# Patient Record
Sex: FEMALE | Race: WHITE | NOT HISPANIC OR LATINO | Employment: FULL TIME | URBAN - METROPOLITAN AREA
[De-identification: names, ages, dates, MRNs, and addresses within clinical notes are randomized per-mention and may not be internally consistent; named-entity substitution may affect disease eponyms.]

---

## 2019-04-21 ENCOUNTER — OUTSIDE PLACE OF SERVICE (OUTPATIENT)
Dept: ADMINISTRATIVE | Facility: OTHER | Age: 59
End: 2019-04-21
Payer: COMMERCIAL

## 2019-04-21 PROCEDURE — 44960 PR APPENDECTOMY,RUPT APPENDX+ABSCESS: ICD-10-PCS | Mod: ,,, | Performed by: SURGERY

## 2019-04-21 PROCEDURE — 44960 APPENDECTOMY: CPT | Mod: ,,, | Performed by: SURGERY

## 2019-04-25 PROCEDURE — 13160 PR SECD CLOS SURG WND EXTEN/COMPLIC: ICD-10-PCS | Mod: 78,,, | Performed by: SURGERY

## 2019-04-25 PROCEDURE — 13160 SEC CLSR SURG WND/DEHSN XTN: CPT | Mod: 78,,, | Performed by: SURGERY

## 2019-05-02 ENCOUNTER — OFFICE VISIT (OUTPATIENT)
Dept: SURGERY | Facility: CLINIC | Age: 59
End: 2019-05-02
Payer: COMMERCIAL

## 2019-05-02 VITALS — HEIGHT: 65 IN | BODY MASS INDEX: 23.99 KG/M2 | WEIGHT: 144 LBS

## 2019-05-02 DIAGNOSIS — K35.32 PERFORATED APPENDICITIS: Primary | ICD-10-CM

## 2019-05-02 PROCEDURE — 99024 PR POST-OP FOLLOW-UP VISIT: ICD-10-PCS | Mod: S$GLB,,, | Performed by: SURGERY

## 2019-05-02 PROCEDURE — 99024 POSTOP FOLLOW-UP VISIT: CPT | Mod: S$GLB,,, | Performed by: SURGERY

## 2019-05-02 RX ORDER — LEVOTHYROXINE SODIUM 137 UG/1
TABLET ORAL
COMMUNITY
Start: 2019-03-11

## 2019-05-02 RX ORDER — FLUTICASONE FUROATE AND VILANTEROL TRIFENATATE 100; 25 UG/1; UG/1
POWDER RESPIRATORY (INHALATION)
Refills: 6 | COMMUNITY
Start: 2019-04-07

## 2019-05-02 RX ORDER — CITALOPRAM 10 MG/1
TABLET ORAL
Refills: 4 | COMMUNITY
Start: 2019-04-06

## 2019-05-02 NOTE — PROGRESS NOTES
Subjective:       Patient ID: Mariel Ortiz is a 59 y.o. female.    Chief Complaint: Post-op Evaluation    HPI:  Ms. Ortiz is postop open appendectomy for a perforated appendicitis on 04/21/2019 followed by abdominal wound closure on 4 2519 with discharge home that day.  She has completed a short course of oral antibiotics post discharge.  No complaints today.  She denies fever, nausea or uncontrolled pain. Bowel function is returning to normal. Appetite has been fair.    Review of Systems    Objective:      Physical Exam:  Abdomen soft and nontender.  Active bowel sounds.  Incision well healed.  Sutures removed.  Assessment:        Perforated appendicitis with diffuse peritonitis  Plan:       Her postoperative recovery has been excellent.  She may begin driving and increase her activity as tolerated.  Call if any problems or concerns.  Return as needed.

## 2024-11-14 ENCOUNTER — TELEPHONE (OUTPATIENT)
Dept: GASTROENTEROLOGY | Facility: CLINIC | Age: 64
End: 2024-11-14
Payer: COMMERCIAL

## 2024-11-14 NOTE — TELEPHONE ENCOUNTER
----- Message from Shante sent at 11/14/2024  9:26 AM CST -----  States she would like to schedule an appt. States she saw her GYN and she told her, there was blood in her rectum. Nothing coming up in Epic. Please call pt [phone]. Thank you

## 2024-11-14 NOTE — TELEPHONE ENCOUNTER
----- Message from Ellyn sent at 11/13/2024  2:21 PM CST -----  Contact: self  Patient is requesting a call back regarding scheduling. Please call back at [phone]

## 2024-11-14 NOTE — TELEPHONE ENCOUNTER
S/W pt & she stated her Gyn said that she had a infection and blood in her rectum. She was put on antibiotics & pt stated she has not had blood in her stool since. She also stated that her Gyn rec that she has a consult with [facility].. Informed pt to get with her PCP for a referral to a GI provider who could see the pt sooner.-TUNG

## 2024-11-15 NOTE — TELEPHONE ENCOUNTER
Pt Advice, Appointment Access  [facility] Staff  Caller: self (Today,  8:07 AM)  Type:  Patient Returning Call    Who Called:BARBARA Ortiz  Who Left Message for Patient:aspen  Does the patient know what this is regarding?:appt  Would the patient rather a call back or a response via [facility]? cb  Best Call Back Number:[phone]  Additional Information: n/a